# Patient Record
Sex: FEMALE | Race: WHITE | NOT HISPANIC OR LATINO | Employment: OTHER | URBAN - METROPOLITAN AREA
[De-identification: names, ages, dates, MRNs, and addresses within clinical notes are randomized per-mention and may not be internally consistent; named-entity substitution may affect disease eponyms.]

---

## 2017-02-09 ENCOUNTER — TELEPHONE (OUTPATIENT)
Dept: URGENT CARE | Facility: CLINIC | Age: 52
End: 2017-02-09

## 2017-02-09 NOTE — TELEPHONE ENCOUNTER
"Called patient and asked if she had made a podiatry appointment, as Dr. Flores wanted to ensure that she made an appointment w/ podiatry to get follow-up for the medication she was started on. Patient stated that she had not yet made an appoinment, but she \"will\". Informed pt it was important to follow-up with podiatry, as the medication she was prescribed requires f/u.   "

## 2018-06-05 ENCOUNTER — APPOINTMENT (OUTPATIENT)
Dept: GENERAL RADIOLOGY | Facility: HOSPITAL | Age: 53
End: 2018-06-05

## 2018-06-05 ENCOUNTER — HOSPITAL ENCOUNTER (EMERGENCY)
Facility: HOSPITAL | Age: 53
Discharge: HOME | End: 2018-06-05
Attending: EMERGENCY MEDICINE | Admitting: EMERGENCY MEDICINE

## 2018-06-05 VITALS
HEIGHT: 70 IN | HEART RATE: 97 BPM | OXYGEN SATURATION: 99 % | TEMPERATURE: 97.9 F | RESPIRATION RATE: 16 BRPM | BODY MASS INDEX: 34.36 KG/M2 | SYSTOLIC BLOOD PRESSURE: 132 MMHG | DIASTOLIC BLOOD PRESSURE: 80 MMHG | WEIGHT: 240 LBS

## 2018-06-05 DIAGNOSIS — R53.83 FATIGUE, UNSPECIFIED TYPE: ICD-10-CM

## 2018-06-05 DIAGNOSIS — R20.2 PARESTHESIA: Primary | ICD-10-CM

## 2018-06-05 DIAGNOSIS — R42 DIZZINESS: ICD-10-CM

## 2018-06-05 LAB
ALBUMIN SERPL-MCNC: 4.1 G/DL (ref 3.2–4.8)
ALBUMIN/GLOB SERPL: 1.4 G/DL (ref 1.5–2.5)
ALP SERPL-CCNC: 59 U/L (ref 25–100)
ALT SERPL W P-5'-P-CCNC: 13 U/L (ref 7–40)
ANION GAP SERPL CALCULATED.3IONS-SCNC: 6 MMOL/L (ref 3–11)
AST SERPL-CCNC: 15 U/L (ref 0–33)
BASOPHILS # BLD AUTO: 0.02 10*3/MM3 (ref 0–0.2)
BASOPHILS NFR BLD AUTO: 0.3 % (ref 0–1)
BILIRUB SERPL-MCNC: 0.3 MG/DL (ref 0.3–1.2)
BILIRUB UR QL STRIP: NEGATIVE
BUN BLD-MCNC: 20 MG/DL (ref 9–23)
BUN/CREAT SERPL: 16.7 (ref 7–25)
CALCIUM SPEC-SCNC: 9.2 MG/DL (ref 8.7–10.4)
CHLORIDE SERPL-SCNC: 105 MMOL/L (ref 99–109)
CLARITY UR: CLEAR
CO2 SERPL-SCNC: 27 MMOL/L (ref 20–31)
COLOR UR: YELLOW
CREAT BLD-MCNC: 1.2 MG/DL (ref 0.6–1.3)
D DIMER PPP FEU-MCNC: 0.31 MG/L (FEU) (ref 0–0.5)
DEPRECATED RDW RBC AUTO: 44.3 FL (ref 37–54)
EOSINOPHIL # BLD AUTO: 0.07 10*3/MM3 (ref 0–0.3)
EOSINOPHIL NFR BLD AUTO: 1 % (ref 0–3)
ERYTHROCYTE [DISTWIDTH] IN BLOOD BY AUTOMATED COUNT: 13.6 % (ref 11.3–14.5)
GFR SERPL CREATININE-BSD FRML MDRD: 47 ML/MIN/1.73
GLOBULIN UR ELPH-MCNC: 3 GM/DL
GLUCOSE BLD-MCNC: 97 MG/DL (ref 70–100)
GLUCOSE BLDC GLUCOMTR-MCNC: 92 MG/DL (ref 70–130)
GLUCOSE UR STRIP-MCNC: NEGATIVE MG/DL
HCT VFR BLD AUTO: 40.7 % (ref 34.5–44)
HGB BLD-MCNC: 13.5 G/DL (ref 11.5–15.5)
HGB UR QL STRIP.AUTO: NEGATIVE
HOLD SPECIMEN: NORMAL
HOLD SPECIMEN: NORMAL
IMM GRANULOCYTES # BLD: 0.01 10*3/MM3 (ref 0–0.03)
IMM GRANULOCYTES NFR BLD: 0.1 % (ref 0–0.6)
KETONES UR QL STRIP: NEGATIVE
LEUKOCYTE ESTERASE UR QL STRIP.AUTO: NEGATIVE
LYMPHOCYTES # BLD AUTO: 2.91 10*3/MM3 (ref 0.6–4.8)
LYMPHOCYTES NFR BLD AUTO: 41.3 % (ref 24–44)
MAGNESIUM SERPL-MCNC: 2.1 MG/DL (ref 1.3–2.7)
MCH RBC QN AUTO: 29.3 PG (ref 27–31)
MCHC RBC AUTO-ENTMCNC: 33.2 G/DL (ref 32–36)
MCV RBC AUTO: 88.5 FL (ref 80–99)
MONOCYTES # BLD AUTO: 0.59 10*3/MM3 (ref 0–1)
MONOCYTES NFR BLD AUTO: 8.4 % (ref 0–12)
NEUTROPHILS # BLD AUTO: 3.45 10*3/MM3 (ref 1.5–8.3)
NEUTROPHILS NFR BLD AUTO: 49 % (ref 41–71)
NITRITE UR QL STRIP: NEGATIVE
PH UR STRIP.AUTO: 6.5 [PH] (ref 5–8)
PLATELET # BLD AUTO: 262 10*3/MM3 (ref 150–450)
PMV BLD AUTO: 10.1 FL (ref 6–12)
POTASSIUM BLD-SCNC: 3.8 MMOL/L (ref 3.5–5.5)
PROT SERPL-MCNC: 7.1 G/DL (ref 5.7–8.2)
PROT UR QL STRIP: NEGATIVE
RBC # BLD AUTO: 4.6 10*6/MM3 (ref 3.89–5.14)
SODIUM BLD-SCNC: 138 MMOL/L (ref 132–146)
SP GR UR STRIP: <=1.005 (ref 1–1.03)
TROPONIN I SERPL-MCNC: 0 NG/ML (ref 0–0.07)
UROBILINOGEN UR QL STRIP: NORMAL
WBC NRBC COR # BLD: 7.04 10*3/MM3 (ref 3.5–10.8)
WHOLE BLOOD HOLD SPECIMEN: NORMAL
WHOLE BLOOD HOLD SPECIMEN: NORMAL

## 2018-06-05 PROCEDURE — 80053 COMPREHEN METABOLIC PANEL: CPT | Performed by: EMERGENCY MEDICINE

## 2018-06-05 PROCEDURE — 81003 URINALYSIS AUTO W/O SCOPE: CPT | Performed by: EMERGENCY MEDICINE

## 2018-06-05 PROCEDURE — 85025 COMPLETE CBC W/AUTO DIFF WBC: CPT | Performed by: EMERGENCY MEDICINE

## 2018-06-05 PROCEDURE — 85379 FIBRIN DEGRADATION QUANT: CPT | Performed by: EMERGENCY MEDICINE

## 2018-06-05 PROCEDURE — 84484 ASSAY OF TROPONIN QUANT: CPT

## 2018-06-05 PROCEDURE — 83735 ASSAY OF MAGNESIUM: CPT | Performed by: EMERGENCY MEDICINE

## 2018-06-05 PROCEDURE — 93005 ELECTROCARDIOGRAM TRACING: CPT | Performed by: EMERGENCY MEDICINE

## 2018-06-05 PROCEDURE — 71045 X-RAY EXAM CHEST 1 VIEW: CPT

## 2018-06-05 PROCEDURE — 99284 EMERGENCY DEPT VISIT MOD MDM: CPT

## 2018-06-05 PROCEDURE — 82962 GLUCOSE BLOOD TEST: CPT

## 2018-06-05 RX ORDER — SODIUM CHLORIDE 0.9 % (FLUSH) 0.9 %
10 SYRINGE (ML) INJECTION AS NEEDED
Status: DISCONTINUED | OUTPATIENT
Start: 2018-06-05 | End: 2018-06-05 | Stop reason: HOSPADM

## 2018-06-05 NOTE — ED PROVIDER NOTES
Subjective   Lucina Plascencia is a 52 y.o.female who presents to the ED with c/o fatigue with onset 2 weeks ago. She reports that she gradually developed severe worsening fatigue. She notes that she has not been feeling her usual self and has developed severe dizziness and nausea when standing for the past 2 weeks secondary to her fatigue. She has also developed left leg numbness and tingling sensations since 1800 yesterday which prompted her visit to the ED for evaluation of possible DVT. She also complains of leg swelling 2 weeks ago but denies any other complaints at this time.        History provided by:  Patient  Fatigue   Severity:  Moderate  Onset quality:  Sudden  Timing:  Constant  Progression:  Worsening  Chronicity:  New  Associated symptoms: fatigue and nausea        Review of Systems   Constitutional: Positive for fatigue.   Cardiovascular: Positive for leg swelling.   Gastrointestinal: Positive for nausea.   Neurological: Positive for dizziness and numbness.   All other systems reviewed and are negative.      Past Medical History:   Diagnosis Date   • Cervical cancer        No Known Allergies    Past Surgical History:   Procedure Laterality Date   • LASER ABLATION/CAUTERIZATION OF ENDOMETRIAL IMPLANTS         History reviewed. No pertinent family history.    Social History     Social History   • Marital status: Single     Social History Main Topics   • Smoking status: Current Every Day Smoker     Packs/day: 0.50     Types: Cigarettes   • Smokeless tobacco: Never Used   • Alcohol use No   • Drug use: No   • Sexual activity: Defer     Other Topics Concern   • Not on file     Social History Narrative    single         Objective   Physical Exam   Constitutional: She is oriented to person, place, and time. She appears well-developed and well-nourished. No distress.   HENT:   Head: Normocephalic and atraumatic.   Nose: Nose normal.   Eyes: Conjunctivae are normal. No scleral icterus.   Neck: Normal range  of motion. Neck supple.   Cardiovascular: Normal rate, regular rhythm and normal heart sounds.    No murmur heard.  Pulmonary/Chest: Effort normal and breath sounds normal. No respiratory distress.   Abdominal: Soft. Bowel sounds are normal. There is no tenderness.   Musculoskeletal:   Motor intact. Mild sensory deficit LLE relative to right.   Neurological: She is alert and oriented to person, place, and time.   Skin: Skin is warm and dry.   Psychiatric: She has a normal mood and affect. Her behavior is normal.   Nursing note and vitals reviewed.      Procedures         ED Course       No results found for this or any previous visit (from the past 24 hour(s)).  Note: In addition to lab results from this visit, the labs listed above may include labs taken at another facility or during a different encounter within the last 24 hours. Please correlate lab times with ED admission and discharge times for further clarification of the services performed during this visit.    XR Chest 1 View   Final Result   1. No radiographic findings of acute cardiopulmonary disease.      THIS DOCUMENT HAS BEEN ELECTRONICALLY SIGNED BY FUNMILAYO KING MD        Vitals:    06/05/18 0242 06/05/18 0245 06/05/18 0246 06/05/18 0423   BP: 145/75 142/74 142/82 132/80   BP Location:       Patient Position: Lying Sitting Standing    Pulse: 63 63 103 97   Resp:    16   Temp:       TempSrc:       SpO2:    99%   Weight:       Height:         Medications - No data to display  ECG/EMG Results (last 24 hours)     ** No results found for the last 24 hours. **         Results for NELLA BOTELLONN JOSE MARIA (MRN [id number]) as of 6/7/2018 21:10   Ref. Range 6/5/2018 02:53   Troponin I Latest Ref Range: 0.00 - 0.07 ng/mL 0.00     Results for JOYCELYN BOTELLOQUELYNN JOSE MARIA (MRN [id number]) as of 6/7/2018 21:10   Ref. Range 6/5/2018 02:42   Glucose Latest Ref Range: 70 - 100 mg/dL 97   Sodium Latest Ref Range: 132 - 146 mmol/L 138   Potassium Latest Ref Range: 3.5 - 5.5 mmol/L  3.8   CO2 Latest Ref Range: 20.0 - 31.0 mmol/L 27.0   Chloride Latest Ref Range: 99 - 109 mmol/L 105   Anion Gap Latest Ref Range: 3.0 - 11.0 mmol/L 6.0   Creatinine Latest Ref Range: 0.60 - 1.30 mg/dL 1.20   BUN Latest Ref Range: 9 - 23 mg/dL 20   BUN/Creatinine Ratio Latest Ref Range: 7.0 - 25.0  16.7   Calcium Latest Ref Range: 8.7 - 10.4 mg/dL 9.2   eGFR Non African Amer Latest Ref Range: >60 mL/min/1.73 47 (L)   Alkaline Phosphatase Latest Ref Range: 25 - 100 U/L 59   Total Protein Latest Ref Range: 5.7 - 8.2 g/dL 7.1   ALT (SGPT) Latest Ref Range: 7 - 40 U/L 13   AST (SGOT) Latest Ref Range: 0 - 33 U/L 15   Total Bilirubin Latest Ref Range: 0.3 - 1.2 mg/dL 0.3   Albumin Latest Ref Range: 3.20 - 4.80 g/dL 4.10   Globulin Latest Units: gm/dL 3.0   A/G Ratio Latest Ref Range: 1.5 - 2.5 g/dL 1.4 (L)   Magnesium Latest Ref Range: 1.3 - 2.7 mg/dL 2.1   D-dimer, Quant Latest Ref Range: 0.00 - 0.50 mg/L (FEU) 0.31   WBC Latest Ref Range: 3.50 - 10.80 10*3/mm3 7.04   RBC Latest Ref Range: 3.89 - 5.14 10*6/mm3 4.60   Hemoglobin Latest Ref Range: 11.5 - 15.5 g/dL 13.5   Hematocrit Latest Ref Range: 34.5 - 44.0 % 40.7   RDW Latest Ref Range: 11.3 - 14.5 % 13.6   MCV Latest Ref Range: 80.0 - 99.0 fL 88.5   MCH Latest Ref Range: 27.0 - 31.0 pg 29.3   MCHC Latest Ref Range: 32.0 - 36.0 g/dL 33.2   MPV Latest Ref Range: 6.0 - 12.0 fL 10.1   Platelets Latest Ref Range: 150 - 450 10*3/mm3 262   RDW-SD Latest Ref Range: 37.0 - 54.0 fl 44.3   Neutrophil % Latest Ref Range: 41.0 - 71.0 % 49.0   Lymphocyte % Latest Ref Range: 24.0 - 44.0 % 41.3   Monocyte % Latest Ref Range: 0.0 - 12.0 % 8.4   Eosinophil % Latest Ref Range: 0.0 - 3.0 % 1.0   Basophil % Latest Ref Range: 0.0 - 1.0 % 0.3   Immature Grans % Latest Ref Range: 0.0 - 0.6 % 0.1   Neutrophils, Absolute Latest Ref Range: 1.50 - 8.30 10*3/mm3 3.45   Lymphocytes, Absolute Latest Ref Range: 0.60 - 4.80 10*3/mm3 2.91   Monocytes, Absolute Latest Ref Range: 0.00 - 1.00 10*3/mm3  0.59   Eosinophils, Absolute Latest Ref Range: 0.00 - 0.30 10*3/mm3 0.07   Basophils, Absolute Latest Ref Range: 0.00 - 0.20 10*3/mm3 0.02   Immature Grans, Absolute Latest Ref Range: 0.00 - 0.03 10*3/mm3 0.01     Results for CAROLYN BOTELLO (MRN [id number]) as of 6/7/2018 21:10   Ref. Range 6/5/2018 02:55   Color, UA Latest Ref Range: Yellow, Straw  Yellow   Appearance, UA Latest Ref Range: Clear  Clear   Specific [place], UA Latest Ref Range: 1.001 - 1.030  <=1.005   pH, UA Latest Ref Range: 5.0 - 8.0  6.5   Glucose, UA Latest Ref Range: Negative  Negative   Ketones, UA Latest Ref Range: Negative  Negative   Blood, UA Latest Ref Range: Negative  Negative   Nitrite, UA Latest Ref Range: Negative  Negative   Leuk Esterase, UA Latest Ref Range: Negative  Negative   Protein, UA Latest Ref Range: Negative  Negative   Bilirubin, UA Latest Ref Range: Negative  Negative   Urobilinogen, UA Latest Ref Range: 0.2 - 1.0 E.U./dL  0.2 E.U./dL     Results for CAROLYN BOTELLO (MRN [id number]) as of 6/7/2018 21:10   Ref. Range 3/31/2017 10:35   Rapid Strep A Screen Latest Ref Range: Negative, VALID, INVALID, Not Performed  Negative                 MDM    Final diagnoses:   Paresthesia   Fatigue, unspecified type   Dizziness       Documentation assistance provided by ajay Rehman.  Information recorded by the scribe was done at my direction and has been verified and validated by me.     Luis Rehman  06/05/18 2487       Kalyan Arciniega DO  06/07/18 2072

## 2018-06-05 NOTE — DISCHARGE INSTRUCTIONS
Follow up with one of the [facility] physician groups below to setup primary care. If you have trouble following up, please call Sharron Jane, our transitional care nurse, at [phone].    (Dr. Coronado, Dr. Bass, Dr. Waggoner, and Dr. Huff.)  [facility], Primary Care, [phone], [address], [place] [address]    [facility], Primary Care, [phone], [address], [address] [place], [address] [facility]) [facility] Medical [facility], Primary Care, [phone], [address], [address] [place], [address] [facility], Primary Care, [phone], [address], [address] [place], [address]     [place] [address] [facility] Medical [facility], Primary Care, [phone], [address], [address] [place], [address]    [place] [address] [facility], Primary Care, [phone], [address]    [place], [address] [facility], Primary Care, [phone], [address], [address] [place], [address] [facility], Primary Care, [phone], [address], [address] [place], [address] [facility]) [facility] Medical [facility], Primary Care, [phone], [address], [address] [place], [address] [facility], Primary Care, [phone], [address], [address] [place], [address] [facility], Primary Care, [phone], [address], [place], [address]     [facility], Primary Care, [phone], [address] [address].